# Patient Record
Sex: FEMALE | Race: ASIAN | NOT HISPANIC OR LATINO | URBAN - METROPOLITAN AREA
[De-identification: names, ages, dates, MRNs, and addresses within clinical notes are randomized per-mention and may not be internally consistent; named-entity substitution may affect disease eponyms.]

---

## 2017-02-28 ENCOUNTER — COMMUNICATION - HEALTHEAST (OUTPATIENT)
Dept: SCHEDULING | Facility: CLINIC | Age: 37
End: 2017-02-28

## 2017-02-28 ENCOUNTER — COMMUNICATION - HEALTHEAST (OUTPATIENT)
Dept: FAMILY MEDICINE | Facility: CLINIC | Age: 37
End: 2017-02-28

## 2017-03-02 ENCOUNTER — OFFICE VISIT - HEALTHEAST (OUTPATIENT)
Dept: FAMILY MEDICINE | Facility: CLINIC | Age: 37
End: 2017-03-02

## 2017-03-02 DIAGNOSIS — M54.2 CERVICALGIA: ICD-10-CM

## 2017-03-02 DIAGNOSIS — Z72.0 TOBACCO USE: ICD-10-CM

## 2017-03-02 DIAGNOSIS — N92.0 MENORRHAGIA: ICD-10-CM

## 2017-03-02 DIAGNOSIS — R52 PAIN: ICD-10-CM

## 2017-03-02 DIAGNOSIS — I10 UNSPECIFIED ESSENTIAL HYPERTENSION: ICD-10-CM

## 2017-03-02 RX ORDER — IBUPROFEN 800 MG/1
800 TABLET, FILM COATED ORAL EVERY 6 HOURS PRN
Qty: 30 TABLET | Refills: 2 | Status: SHIPPED | OUTPATIENT
Start: 2017-03-02

## 2017-03-02 RX ORDER — CYCLOBENZAPRINE HCL 10 MG
10 TABLET ORAL 3 TIMES DAILY PRN
Qty: 90 TABLET | Refills: 2 | Status: SHIPPED | OUTPATIENT
Start: 2017-03-02

## 2017-07-28 ENCOUNTER — COMMUNICATION - HEALTHEAST (OUTPATIENT)
Dept: FAMILY MEDICINE | Facility: CLINIC | Age: 37
End: 2017-07-28

## 2018-02-07 ENCOUNTER — RECORDS - HEALTHEAST (OUTPATIENT)
Dept: ADMINISTRATIVE | Facility: OTHER | Age: 38
End: 2018-02-07

## 2018-04-01 ENCOUNTER — COMMUNICATION - HEALTHEAST (OUTPATIENT)
Dept: FAMILY MEDICINE | Facility: CLINIC | Age: 38
End: 2018-04-01

## 2018-04-04 RX ORDER — METOPROLOL SUCCINATE 200 MG/1
TABLET, EXTENDED RELEASE ORAL
Qty: 30 TABLET | Refills: 1 | Status: SHIPPED | OUTPATIENT
Start: 2018-04-04

## 2018-12-12 ENCOUNTER — RECORDS - HEALTHEAST (OUTPATIENT)
Dept: ADMINISTRATIVE | Facility: OTHER | Age: 38
End: 2018-12-12

## 2021-05-30 ENCOUNTER — RECORDS - HEALTHEAST (OUTPATIENT)
Dept: ADMINISTRATIVE | Facility: CLINIC | Age: 41
End: 2021-05-30

## 2021-05-30 VITALS — BODY MASS INDEX: 32.3 KG/M2 | WEIGHT: 178 LBS

## 2021-06-09 NOTE — PROGRESS NOTES
Assessment/plan  1. Hypertension  Uncontrolled.   Unclear etiology.   Increase metoprolol to 200 mg daily.   New prescription sent, will fill once she has completed current bottle doubling her dose.   Encouraged to monitor her blood pressure twice per week and document. Will call in these blood pressures weekly due to distance.   Again, encouraged regular aerobic exercise, low salt diet, to quit smoking.   Discussed reasons to be seen urgently.   Advised to follow up in clinic in three to four weeks.     2. Tobacco use  Congratulated on motivation to quit.   Started on nicoderm patch. Encouraged compliance. Encouraged avoidance of cigarettes. Cautioned over possible adverse affects. Will follow up on this at the next visit.     3. Menorrhagia  4. Contraception  Discussed options of cycle control.   Has been evaluated for this before.   Patient requests depo provera for now. Discussed possible bleeding affects initially. Discussed possible adverse affects. Aware of timing for follow up.   Refilled ibuprofen. Discussed appropriate use. Cautioned against overuse and chronic use due to hypertension. Will limit use once her menstrual cycle is less.   - medroxyPROGESTERone injection 150 mg (DEPO-PROVERA); Inject 1 mL (150 mg total) into the shoulder, thigh, or buttocks every 3 (three) months.    5. Cervicalgia  6. Pain  Refilled as requested. Cautioned against possible adverse affects.   - cyclobenzaprine (FLEXERIL) 10 MG tablet; Take 1 tablet (10 mg total) by mouth 3 (three) times a day as needed for muscle spasms.  Dispense: 90 tablet; Refill: 2      Subjective  [age] year old female here with several concerns.   She notes high blood pressure since Tuesday.   She was not feeling well on that day. She took her break as usual. Blood pressure was taken by employee health. These were documented, 160's over 100s several times.   She call in. Was told to double her metoprolol. Has been taking 200 mg of metoprolol since  yesterday. She is current menstruating. She thinks her blood pressure is elevated when she is menstruating though her blood pressure has been controlled for several months now. She monitors her blood pressure regularly. She notes some stress lately also. There was a death in the family. She says her  had a heart attack two weeks ago also. She reports all of his tests were normal though. She notes he is free of hypertension, hyperlipidemia, or diabetes. His [age] years old. He does smoke a ppd. She wants to quit smoking now. Would like to restart the patch. Has used this before.  On good days she smokes two cigarettes. On regular days she smokes half pack.   Regarding her menstrual cycle, it is very heavy, often using a pad per hour. This is not new. Has been evaluated by Dr. Stuart in the past for this. She was told to continue her birth control. She admits to poor compliance. Has had the patch, pills, IUD, depo. She thinks they did not suit her. She has regular menstrual cycle though, every twenty eight days. Has some cramps with her cycle. Is currently menstruating and LMP 2/28/17. Takes ibuprofen on those days only. Needs a refill. Would also like a refill for flexeril. Only takes this as needed.     Past Medical History:   Diagnosis Date     IUD (intrauterine device) in place     removed 2012 after 2 years (Mirena) -      Retained placenta      Patient Active Problem List   Diagnosis     Esophageal Reflux     Herpetic Ulceration Of Vulva     Tuberculin PPD Induration Positive Interpretation     Fatigue     Chlamydial Infections     Obesity     Hypertension     Headache     Nicotine Dependence     Abnormal Pap Smear Of Cervix     Hypokalemia     Hematuria     Severe uncontrolled hypertension     Past Surgical History:   Procedure Laterality Date     [place] REMOVAL GALLBLADDER      Description: Cholecystectomy;  Recorded: 11/28/2007;     Family History   Problem Relation Age of Onset     Hypertension  Mother      Hypertension Father      Diabetes type II Mother      Kidney failure Father      Heart disease Father      had heart transplant - age early 60s     Social History     Social History     Marital status: Single     Spouse name: N/A     Number of children: N/A     Years of education: N/A     Occupational History     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     Social History Main Topics     Smoking status: Current Some Day Smoker     Smokeless tobacco: Never Used      Comment: 5 cigsa day     Alcohol use Yes      Comment: rare use     Drug use: No     Sexual activity: Yes     Partners: Male     Other Topics Concern     Not on file     Social History Narrative    Born in [facility]; to  August 1980     Current Outpatient Prescriptions on File Prior to Visit   Medication Sig Dispense Refill     blood pressure monitor Kit Use to check blood pressure daily after sitting and resting for 5 minutes 1 each 0     metoprolol succinate (TOPROL-XL) 100 MG 24 hr tablet TAKE 1 TABLET(100 MG) BY MOUTH DAILY 90 tablet 3     No current facility-administered medications on file prior to visit.      Objective  Vitals:    03/02/17 1332   BP: (!) 160/102   Pulse:    Resp:        General Appearance:  Alert, cooperative, no distress, appears stated age   Head:  Normocephalic, without obvious abnormality, atraumatic   Eyes:  PERRL, conjunctiva/corneas clear, EOM's intact   Throat: Lips, mucosa, and tongue normal   Neck: Supple, symmetrical, trachea midline, no adenopathy;  thyroid: not enlarged, symmetric, no tenderness/mass/nodules; no carotid bruit or JVD   Lungs:   Clear to auscultation bilaterally, respirations unlabored   Heart:  Regular rate and rhythm, S1 and S2 normal, no murmur, rub, or gallop   Extremities: Extremities normal, atraumatic, no cyanosis or edema   Skin: Skin color, texture, turgor normal, no rashes or lesions   Lymph nodes: Cervical, supraclavicular nodes normal   Neurologic: Normal, CN 2-12 intact